# Patient Record
Sex: FEMALE | Race: WHITE
[De-identification: names, ages, dates, MRNs, and addresses within clinical notes are randomized per-mention and may not be internally consistent; named-entity substitution may affect disease eponyms.]

---

## 2019-03-06 ENCOUNTER — HOSPITAL ENCOUNTER (EMERGENCY)
Dept: HOSPITAL 26 - MED | Age: 34
Discharge: HOME | End: 2019-03-06
Payer: OTHER GOVERNMENT

## 2019-03-06 VITALS — HEIGHT: 64 IN | BODY MASS INDEX: 26.7 KG/M2 | WEIGHT: 156.38 LBS

## 2019-03-06 VITALS — DIASTOLIC BLOOD PRESSURE: 78 MMHG | SYSTOLIC BLOOD PRESSURE: 117 MMHG

## 2019-03-06 VITALS — SYSTOLIC BLOOD PRESSURE: 147 MMHG | DIASTOLIC BLOOD PRESSURE: 89 MMHG

## 2019-03-06 DIAGNOSIS — J32.9: Primary | ICD-10-CM

## 2019-03-06 DIAGNOSIS — H92.03: ICD-10-CM

## 2019-03-06 NOTE — NUR
Patient discharged with v/s stable. Written and verbal after care instructions 
given and explained. 

Patient alert, oriented and verbalized understanding of instructions. 
Ambulatory with steady gait. All questions addressed prior to discharge. ID 
band removed. Patient advised to follow up with PMD. Rx of DIFLUCAN, MOTRIN, 
FLONASE, Z-PACK given. Patient educated on indication of medication including 
possible reaction and side effects. Opportunity to ask questions provided and 
answered.

## 2019-03-06 NOTE — NUR
33/F PRESENTS TO ED, CONCERNED FOR "SINUS INFECTION." PT C/O L SIDED CONGESTION 
WITH GREEN DRAINAGE, L SIDED SINUS PAIN, RADIATING TO L FACE, AND L NECK LYMPH 
NODES, X1 WEEK WORSENING THE PAST FEW DAYS. PT REPORTS ASSOCIATED HEADACHE. 
REPORTS COUGH THAT HAS SINCE RESOLVED. DENIES FEVER, CP, SOB, N/V. 

DENIES MED HX OR RX.

OTC SINUS CLEANSING/IRRIGATION WITHOUT RELIEF.

## 2020-08-02 ENCOUNTER — HOSPITAL ENCOUNTER (EMERGENCY)
Dept: HOSPITAL 26 - MED | Age: 35
Discharge: HOME | End: 2020-08-02
Payer: OTHER GOVERNMENT

## 2020-08-02 VITALS — SYSTOLIC BLOOD PRESSURE: 141 MMHG | DIASTOLIC BLOOD PRESSURE: 78 MMHG

## 2020-08-02 VITALS — HEIGHT: 66 IN | BODY MASS INDEX: 22.98 KG/M2 | WEIGHT: 143 LBS

## 2020-08-02 VITALS — DIASTOLIC BLOOD PRESSURE: 71 MMHG | SYSTOLIC BLOOD PRESSURE: 118 MMHG

## 2020-08-02 DIAGNOSIS — T21.47XA: Primary | ICD-10-CM

## 2020-08-02 DIAGNOSIS — R10.2: ICD-10-CM

## 2020-08-02 DIAGNOSIS — T50.995A: ICD-10-CM
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
## 2020-09-08 ENCOUNTER — HOSPITAL ENCOUNTER (EMERGENCY)
Dept: HOSPITAL 26 - MED | Age: 35
Discharge: HOME | End: 2020-09-08
Payer: COMMERCIAL

## 2020-09-08 VITALS — SYSTOLIC BLOOD PRESSURE: 126 MMHG | DIASTOLIC BLOOD PRESSURE: 62 MMHG

## 2020-09-08 VITALS — WEIGHT: 143 LBS | HEIGHT: 63 IN | BODY MASS INDEX: 25.34 KG/M2

## 2020-09-08 VITALS — SYSTOLIC BLOOD PRESSURE: 114 MMHG | DIASTOLIC BLOOD PRESSURE: 75 MMHG

## 2020-09-08 DIAGNOSIS — H00.025: Primary | ICD-10-CM

## 2020-09-08 NOTE — NUR
33 Y/O F C/C LEFT EYE DISCOMFORT, 6/10 PAIN,NAUSEA,HEADACHE X 3 DAYS. PER PT 
"SMALL BALL ON EYEBALL" CAUSING BLURRY VISION. PT PRESENTS WITH SLIGHT 
BALL-FORMED BELOW EYEBALL. NO HYPHEMA OR SUBCONJUCTIVAL HEMORRAGHE NOTED. 
EYEBALL SCLERA,IRIS,PUPILS WNL. NEURO WNL. NKA. NO HX. NO RX. SIDE RAIL X1.

## 2020-09-08 NOTE — NUR
Patient discharged with v/s stable. Written and verbal after care instructions 
given and explained. 

Patient alert, oriented and verbalized understanding of instructions. 
Ambulatory with steady gait. All questions addressed prior to discharge. ID 
band removed. Patient advised to follow up with PMD. Rx of ERYTHROMYCIN given. 
Patient educated on indication of medication including possible reaction and 
side effects. Opportunity to ask questions provided and answered.

## 2021-09-15 ENCOUNTER — HOSPITAL ENCOUNTER (EMERGENCY)
Dept: HOSPITAL 26 - MED | Age: 36
Discharge: HOME | End: 2021-09-15
Payer: COMMERCIAL

## 2021-09-15 VITALS — DIASTOLIC BLOOD PRESSURE: 71 MMHG | SYSTOLIC BLOOD PRESSURE: 105 MMHG

## 2021-09-15 VITALS — HEIGHT: 63 IN | WEIGHT: 144 LBS | BODY MASS INDEX: 25.52 KG/M2

## 2021-09-15 VITALS — SYSTOLIC BLOOD PRESSURE: 105 MMHG | DIASTOLIC BLOOD PRESSURE: 71 MMHG

## 2021-09-15 DIAGNOSIS — U07.1: Primary | ICD-10-CM

## 2021-09-15 DIAGNOSIS — Z11.3: ICD-10-CM

## 2021-09-15 PROCEDURE — 99283 EMERGENCY DEPT VISIT LOW MDM: CPT

## 2021-09-15 PROCEDURE — 87491 CHLMYD TRACH DNA AMP PROBE: CPT

## 2021-09-15 PROCEDURE — 81025 URINE PREGNANCY TEST: CPT

## 2021-09-15 PROCEDURE — U0003 INFECTIOUS AGENT DETECTION BY NUCLEIC ACID (DNA OR RNA); SEVERE ACUTE RESPIRATORY SYNDROME CORONAVIRUS 2 (SARS-COV-2) (CORONAVIRUS DISEASE [COVID-19]), AMPLIFIED PROBE TECHNIQUE, MAKING USE OF HIGH THROUGHPUT TECHNOLOGIES AS DESCRIBED BY CMS-2020-01-R: HCPCS

## 2021-09-15 PROCEDURE — 81002 URINALYSIS NONAUTO W/O SCOPE: CPT

## 2021-09-15 NOTE — NUR
Patient discharged with v/s stable. Written and verbal after care instructions 
given and explained. 

Patient alert, oriented and verbalized understanding of instructions. 
Ambulatory with steady gait. All questions addressed prior to discharge. ID 
band removed. Patient advised to follow up with PMD. Rx of DOXYCYCLINE, ZOFRAN 
given. Patient educated on indication of medication including possible reaction 
and side effects. Opportunity to ask questions provided and answered.

## 2021-09-18 NOTE — NUR
Positive COVID 19 test results recived from Lab. Per lab copy of test results 
were given to Infection Control.